# Patient Record
Sex: MALE | Race: WHITE | NOT HISPANIC OR LATINO
[De-identification: names, ages, dates, MRNs, and addresses within clinical notes are randomized per-mention and may not be internally consistent; named-entity substitution may affect disease eponyms.]

---

## 2017-01-04 ENCOUNTER — APPOINTMENT (OUTPATIENT)
Dept: COLORECTAL SURGERY | Facility: CLINIC | Age: 72
End: 2017-01-04

## 2017-01-04 ENCOUNTER — APPOINTMENT (OUTPATIENT)
Dept: INTERNAL MEDICINE | Facility: CLINIC | Age: 72
End: 2017-01-04

## 2019-09-30 ENCOUNTER — APPOINTMENT (OUTPATIENT)
Dept: ORTHOPEDIC SURGERY | Facility: CLINIC | Age: 74
End: 2019-09-30

## 2021-11-13 ENCOUNTER — TRANSCRIPTION ENCOUNTER (OUTPATIENT)
Age: 76
End: 2021-11-13

## 2023-09-05 ENCOUNTER — APPOINTMENT (OUTPATIENT)
Dept: PAIN MANAGEMENT | Facility: CLINIC | Age: 78
End: 2023-09-05
Payer: MEDICARE

## 2023-09-05 VITALS
HEART RATE: 56 BPM | DIASTOLIC BLOOD PRESSURE: 73 MMHG | SYSTOLIC BLOOD PRESSURE: 159 MMHG | HEIGHT: 65 IN | WEIGHT: 185 LBS | BODY MASS INDEX: 30.82 KG/M2

## 2023-09-05 DIAGNOSIS — M54.2 CERVICALGIA: ICD-10-CM

## 2023-09-05 DIAGNOSIS — G89.29 CERVICALGIA: ICD-10-CM

## 2023-09-05 DIAGNOSIS — F03.90 UNSPECIFIED DEMENTIA W/OUT BEHAVIORAL DISTURBANCE: ICD-10-CM

## 2023-09-05 PROCEDURE — 99205 OFFICE O/P NEW HI 60 MIN: CPT

## 2023-09-10 LAB
ERYTHROCYTE [SEDIMENTATION RATE] IN BLOOD BY WESTERGREN METHOD: 10 MM/HR
FOLATE SERPL-MCNC: 17.5 NG/ML
T PALLIDUM AB SER QL IA: NEGATIVE
T3 SERPL-MCNC: 123 NG/DL
T4 SERPL-MCNC: 6.8 UG/DL
TSH SERPL-ACNC: 2.67 UIU/ML
VIT B12 SERPL-MCNC: 774 PG/ML

## 2023-09-10 PROCEDURE — 99215 OFFICE O/P EST HI 40 MIN: CPT

## 2023-09-10 NOTE — HISTORY OF PRESENT ILLNESS
[FreeTextEntry1] : This is a case of a  78 -year-old right-handed male with a past medical history of  e levated cholesterol on statin over past one year notes forgtting shirt term  couldnot remember going to [place], disoriented in [place], thought he was in [place]  Cant oput things together like a door knob.  Happy no agitation. Loves to dance, drink one night at night.  Sleep good  gets up to go to bathroom  Balance good. No agitation. No headache. Neck pain chronic but intermittent.  Motrin prn.  HNo tremor or feeling stiff.  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  Imaging: None; No concussion Medications: None No urinary incontinence (Non pain) (Pain)  Interventions: None CAM therapies: None SHX: Stopped 40 years ago; No tobacco ppd; Cannabis  Family history: Noncontributory Allergies: NKA

## 2023-09-26 ENCOUNTER — RX RENEWAL (OUTPATIENT)
Age: 78
End: 2023-09-26

## 2023-09-26 ENCOUNTER — APPOINTMENT (OUTPATIENT)
Dept: NEUROLOGY | Facility: CLINIC | Age: 78
End: 2023-09-26
Payer: MEDICARE

## 2023-09-26 VITALS — DIASTOLIC BLOOD PRESSURE: 70 MMHG | SYSTOLIC BLOOD PRESSURE: 136 MMHG | HEART RATE: 52 BPM

## 2023-09-26 DIAGNOSIS — F01.A0 VASCULAR DEMENTIA, MILD, WITHOUT BEHAVIORAL DISTURBANCE, PSYCHOTIC DISTURBANCE, MOOD DISTURBANCE, AND ANXIETY: ICD-10-CM

## 2023-09-26 PROCEDURE — 99205 OFFICE O/P NEW HI 60 MIN: CPT

## 2023-09-26 RX ORDER — GALANTAMINE 8 MG/1
8 CAPSULE, EXTENDED RELEASE ORAL DAILY
Qty: 30 | Refills: 3 | Status: ACTIVE | COMMUNITY
Start: 2023-09-26 | End: 1900-01-01

## 2023-10-03 LAB — HCYS SERPL-MCNC: 11 UMOL/L

## 2023-10-06 LAB — METHYLMALONATE SERPL-SCNC: 247 NMOL/L

## 2023-10-08 LAB — VIT B1 SERPL-MCNC: 162.4 NMOL/L

## 2024-08-19 ENCOUNTER — APPOINTMENT (OUTPATIENT)
Dept: NEUROLOGY | Facility: CLINIC | Age: 79
End: 2024-08-19

## 2025-06-17 ENCOUNTER — APPOINTMENT (OUTPATIENT)
Age: 80
End: 2025-06-17